# Patient Record
Sex: MALE | Race: WHITE | NOT HISPANIC OR LATINO | URBAN - METROPOLITAN AREA
[De-identification: names, ages, dates, MRNs, and addresses within clinical notes are randomized per-mention and may not be internally consistent; named-entity substitution may affect disease eponyms.]

---

## 2021-11-15 ENCOUNTER — NEW PATIENT COMPREHENSIVE (OUTPATIENT)
Dept: URBAN - METROPOLITAN AREA CLINIC 53 | Facility: CLINIC | Age: 76
End: 2021-11-15

## 2021-11-15 DIAGNOSIS — H35.361: ICD-10-CM

## 2021-11-15 DIAGNOSIS — H35.371: ICD-10-CM

## 2021-11-15 DIAGNOSIS — H43.813: ICD-10-CM

## 2021-11-15 DIAGNOSIS — H25.13: ICD-10-CM

## 2021-11-15 DIAGNOSIS — H40.012: ICD-10-CM

## 2021-11-15 PROCEDURE — 92133 CPTRZD OPH DX IMG PST SGM ON: CPT

## 2021-11-15 PROCEDURE — 92004 COMPRE OPH EXAM NEW PT 1/>: CPT

## 2021-11-15 ASSESSMENT — KERATOMETRY
OS_K1POWER_DIOPTERS: 43.25
OS_K2POWER_DIOPTERS: 44.25
OD_AXISANGLE_DEGREES: 115
OS_AXISANGLE_DEGREES: 89
OS_AXISANGLE2_DEGREES: 179
OD_K2POWER_DIOPTERS: 44.25
OD_AXISANGLE2_DEGREES: 25
OD_K1POWER_DIOPTERS: 43.00

## 2021-11-15 ASSESSMENT — VISUAL ACUITY
OS_SC: 20/30
OS_SC: J1+
OS_CC: 20/25
OD_SC: J1+
OD_CC: 20/20
OD_SC: 20/20-2

## 2021-11-15 ASSESSMENT — TONOMETRY
OD_IOP_MMHG: 15
OS_IOP_MMHG: 15

## 2021-11-15 NOTE — PATIENT DISCUSSION
Based on increased C/D ratio OS>OD. IOP 15/15. (-)Fam hx. OCT RNFL today wnl OD/OS. Will continue to monitor.

## 2022-03-17 ENCOUNTER — CONTACT LENSES/GLASSES VISIT (OUTPATIENT)
Dept: URBAN - METROPOLITAN AREA CLINIC 53 | Facility: CLINIC | Age: 77
End: 2022-03-17

## 2022-03-17 DIAGNOSIS — H52.4: ICD-10-CM

## 2022-03-17 PROCEDURE — 92015 DETERMINE REFRACTIVE STATE: CPT

## 2022-03-17 ASSESSMENT — VISUAL ACUITY
OU_CC: J2
OS_CC: 20/20
OD_CC: 20/20-1

## 2022-03-17 ASSESSMENT — KERATOMETRY
OD_AXISANGLE2_DEGREES: 25
OS_K1POWER_DIOPTERS: 43.25
OS_AXISANGLE_DEGREES: 89
OS_K2POWER_DIOPTERS: 44.25
OS_AXISANGLE2_DEGREES: 179
OD_K1POWER_DIOPTERS: 43.00
OD_K2POWER_DIOPTERS: 44.25
OD_AXISANGLE_DEGREES: 115

## 2022-11-28 ENCOUNTER — COMPREHENSIVE EXAM (OUTPATIENT)
Dept: URBAN - METROPOLITAN AREA CLINIC 53 | Facility: CLINIC | Age: 77
End: 2022-11-28

## 2022-11-28 DIAGNOSIS — H43.813: ICD-10-CM

## 2022-11-28 DIAGNOSIS — H40.012: ICD-10-CM

## 2022-11-28 DIAGNOSIS — H35.371: ICD-10-CM

## 2022-11-28 DIAGNOSIS — H35.361: ICD-10-CM

## 2022-11-28 DIAGNOSIS — H25.13: ICD-10-CM

## 2022-11-28 DIAGNOSIS — H52.4: ICD-10-CM

## 2022-11-28 PROCEDURE — 92134 CPTRZ OPH DX IMG PST SGM RTA: CPT

## 2022-11-28 PROCEDURE — 92014 COMPRE OPH EXAM EST PT 1/>: CPT

## 2022-11-28 PROCEDURE — 92015 DETERMINE REFRACTIVE STATE: CPT

## 2022-11-28 ASSESSMENT — VISUAL ACUITY
OS_GLARE: 20/20
OU_SC: J1@17"
OS_PH: 20/20
OD_GLARE: 20/20
OS_GLARE: 20/20
OS_SC: 20/60
OD_CC: 20/25
OS_CC: 20/30
OU_CC: 20/25
OD_GLARE: 20/20
OD_SC: 20/40
OU_SC: 20/40

## 2022-11-28 ASSESSMENT — KERATOMETRY
OS_K2POWER_DIOPTERS: 44.25
OD_AXISANGLE_DEGREES: 115
OD_AXISANGLE2_DEGREES: 25
OS_AXISANGLE2_DEGREES: 179
OS_K1POWER_DIOPTERS: 43.25
OD_K2POWER_DIOPTERS: 44.25
OD_K1POWER_DIOPTERS: 43.00
OS_AXISANGLE_DEGREES: 89

## 2022-11-28 ASSESSMENT — TONOMETRY
OD_IOP_MMHG: 15
OS_IOP_MMHG: 17

## 2022-11-28 NOTE — PATIENT DISCUSSION
Based on increased C/D ratio OS>OD. IOP 15/17. (-)Fam hx. Last OCT RNFL 11/15/21 wnl OD/OS. RTC in 6 months for follow up with IOP check and OCT RNFL/ HVF. Will continue to monitor.

## 2023-05-31 ENCOUNTER — FOLLOW UP (OUTPATIENT)
Dept: URBAN - METROPOLITAN AREA CLINIC 53 | Facility: CLINIC | Age: 78
End: 2023-05-31

## 2023-05-31 DIAGNOSIS — H40.012: ICD-10-CM

## 2023-05-31 PROCEDURE — 92083 EXTENDED VISUAL FIELD XM: CPT

## 2023-05-31 PROCEDURE — 92012 INTRM OPH EXAM EST PATIENT: CPT

## 2023-05-31 PROCEDURE — 92133 CPTRZD OPH DX IMG PST SGM ON: CPT

## 2023-05-31 ASSESSMENT — VISUAL ACUITY
OD_CC: 20/25
OU_CC: 20/25
OS_CC: 20/30-2

## 2023-05-31 ASSESSMENT — KERATOMETRY
OS_AXISANGLE_DEGREES: 89
OS_K2POWER_DIOPTERS: 44.25
OS_AXISANGLE2_DEGREES: 179
OS_K1POWER_DIOPTERS: 43.25
OD_K2POWER_DIOPTERS: 44.25
OD_K1POWER_DIOPTERS: 43.00
OD_AXISANGLE_DEGREES: 115
OD_AXISANGLE2_DEGREES: 25

## 2023-05-31 ASSESSMENT — TONOMETRY
OS_IOP_MMHG: 19
OD_IOP_MMHG: 16

## 2023-11-29 ENCOUNTER — COMPREHENSIVE EXAM (OUTPATIENT)
Dept: URBAN - METROPOLITAN AREA CLINIC 53 | Facility: CLINIC | Age: 78
End: 2023-11-29

## 2023-11-29 DIAGNOSIS — H43.813: ICD-10-CM

## 2023-11-29 DIAGNOSIS — H40.012: ICD-10-CM

## 2023-11-29 DIAGNOSIS — H35.371: ICD-10-CM

## 2023-11-29 DIAGNOSIS — H52.4: ICD-10-CM

## 2023-11-29 DIAGNOSIS — H25.13: ICD-10-CM

## 2023-11-29 DIAGNOSIS — H35.361: ICD-10-CM

## 2023-11-29 PROCEDURE — 92134 CPTRZ OPH DX IMG PST SGM RTA: CPT

## 2023-11-29 PROCEDURE — 92014 COMPRE OPH EXAM EST PT 1/>: CPT

## 2023-11-29 PROCEDURE — 92015 DETERMINE REFRACTIVE STATE: CPT

## 2023-11-29 ASSESSMENT — TONOMETRY
OD_IOP_MMHG: 17
OS_IOP_MMHG: 18

## 2023-11-29 ASSESSMENT — VISUAL ACUITY
OS_GLARE: 20/25-1
OS_PH: 20/20
OD_GLARE: 20/20-1
OD_CC: 20/25-1
OD_GLARE: 20/20
OS_GLARE: 20/25
OU_SC: J1+@16"
OS_CC: 20/30-1

## 2024-10-01 ENCOUNTER — FOLLOW UP (OUTPATIENT)
Dept: URBAN - METROPOLITAN AREA CLINIC 53 | Facility: CLINIC | Age: 79
End: 2024-10-01

## 2024-10-01 DIAGNOSIS — H40.013: ICD-10-CM

## 2024-10-01 DIAGNOSIS — H25.13: ICD-10-CM

## 2024-10-01 PROCEDURE — 99213 OFFICE O/P EST LOW 20 MIN: CPT

## 2024-10-01 PROCEDURE — 76514 ECHO EXAM OF EYE THICKNESS: CPT

## 2024-10-04 ENCOUNTER — DIAGNOSTICS ONLY (OUTPATIENT)
Dept: URBAN - METROPOLITAN AREA CLINIC 49 | Facility: LOCATION | Age: 79
End: 2024-10-04

## 2024-10-04 DIAGNOSIS — H40.013: ICD-10-CM

## 2024-10-04 PROCEDURE — 92133 CPTRZD OPH DX IMG PST SGM ON: CPT

## 2024-10-04 PROCEDURE — 92083 EXTENDED VISUAL FIELD XM: CPT
